# Patient Record
Sex: MALE | Race: ASIAN | URBAN - METROPOLITAN AREA
[De-identification: names, ages, dates, MRNs, and addresses within clinical notes are randomized per-mention and may not be internally consistent; named-entity substitution may affect disease eponyms.]

---

## 2017-01-04 ENCOUNTER — OFFICE VISIT (OUTPATIENT)
Dept: INTERNAL MEDICINE | Facility: CLINIC | Age: 62
End: 2017-01-04
Payer: COMMERCIAL

## 2017-01-04 VITALS
WEIGHT: 194.4 LBS | HEIGHT: 69 IN | HEART RATE: 65 BPM | TEMPERATURE: 97.8 F | SYSTOLIC BLOOD PRESSURE: 140 MMHG | BODY MASS INDEX: 28.79 KG/M2 | OXYGEN SATURATION: 99 % | DIASTOLIC BLOOD PRESSURE: 76 MMHG

## 2017-01-04 DIAGNOSIS — E11.21 TYPE 2 DIABETES MELLITUS WITH DIABETIC NEPHROPATHY, WITH LONG-TERM CURRENT USE OF INSULIN (H): ICD-10-CM

## 2017-01-04 DIAGNOSIS — Z79.4 TYPE 2 DIABETES MELLITUS WITH DIABETIC NEPHROPATHY, WITH LONG-TERM CURRENT USE OF INSULIN (H): ICD-10-CM

## 2017-01-04 DIAGNOSIS — Z23 NEED FOR PROPHYLACTIC VACCINATION AND INOCULATION AGAINST INFLUENZA: ICD-10-CM

## 2017-01-04 DIAGNOSIS — R10.11 RUQ ABDOMINAL PAIN: Primary | ICD-10-CM

## 2017-01-04 PROCEDURE — 90471 IMMUNIZATION ADMIN: CPT | Performed by: INTERNAL MEDICINE

## 2017-01-04 PROCEDURE — 90686 IIV4 VACC NO PRSV 0.5 ML IM: CPT | Performed by: INTERNAL MEDICINE

## 2017-01-04 PROCEDURE — 99214 OFFICE O/P EST MOD 30 MIN: CPT | Mod: 25 | Performed by: INTERNAL MEDICINE

## 2017-01-04 NOTE — PROGRESS NOTES
"  SUBJECTIVE:                                                    Ulises Roblero is a 61 year old male who presents to clinic today for the following health issues:    ABDOMINAL PAIN     Onset: 2 days     Description:   Character: Continuous Dull ache  Location: right upper quadrant  Radiation: None    Intensity: mild    Progression of Symptoms:  same    Accompanying Signs & Symptoms:  Fever/Chills?: no   Gas/Bloating: YES- more than usual  Nausea: no   Vomitting: no   Diarrhea?: no   Constipation:no   Dysuria or Hematuria: no    History:   Trauma: no   Previous similar pain: no    Previous tests done: none    Precipitating factors:   Does the pain change with:     Food: no      BM: no     Urination: no     Alleviating factors:  none    Therapies Tried and outcome: none    LMP:  not applicable       Problem list and histories reviewed & adjusted, as indicated.  Additional history: as documented    Problem list, Medication list, Allergies, and Medical/Social/Surgical histories reviewed in EPIC and updated as appropriate.    ROS:  Constitutional, HEENT, cardiovascular, pulmonary, gi and gu systems are negative, except as otherwise noted.    OBJECTIVE:                                                    /76 mmHg  Pulse 65  Temp(Src) 97.8  F (36.6  C) (Oral)  Ht 5' 8.5\" (1.74 m)  Wt 194 lb 6.4 oz (88.179 kg)  BMI 29.13 kg/m2  SpO2 99%  Body mass index is 29.13 kg/(m^2).  GENERAL APPEARANCE: alert, no distress and over weight  HENT: nose and mouth without ulcers or lesions  NECK: no adenopathy, no asymmetry, masses, or scars and thyroid normal to palpation  RESP: lungs clear to auscultation - no rales, rhonchi or wheezes  CV: regular rates and rhythm, normal S1 S2, no S3 or S4 and no murmur, click or rub  ABDOMEN: soft, mild tenderness in ruq. No rebound or guarding, without hepatosplenomegaly or masses and bowel sounds normal    Diagnostic test results:  Pt was to go to lab but did not show up  "     ASSESSMENT/PLAN:                                                    1. RUQ abdominal pain  Mild RUQ pain but duration is > 1 mo so w/u warranted  - US Abdomen Limited; Future  - Hepatic panel (Albumin, ALT, AST, Bili, Alk Phos, TP); Future  - Lipase; Future    2. Need for prophylactic vaccination and inoculation against influenza  - FLU VAC, SPLIT VIRUS IM > 3 YO (QUADRIVALENT) [75841]  - Vaccine Administration, Initial [33784]    3. Type 2 diabetes mellitus with diabetic nephropathy, with long-term current use of insulin (H)  Poorly controlled- recheck today but pt did not show up for labs?  - Hemoglobin A1c; Future    4. HTN  Worse- maybe related to pain?  Recheck on return     Follow up with Provider after next lab draw -     Bong Whitmore MD  [facility]        Injectable Influenza Immunization Documentation    1.  Is the person to be vaccinated sick today?  No    2. Does the person to be vaccinated have an allergy to eggs or to a component of the vaccine?  No    3. Has the person to be vaccinated today ever had a serious reaction to influenza vaccine in the past?  No    4. Has the person to be vaccinated ever had Guillain-Wilson syndrome?  No     Form completed by Yola Harrington CMA

## 2017-01-04 NOTE — NURSING NOTE
"Chief Complaint   Patient presents with     Abdominal Pain     right upper        Initial /76 mmHg  Pulse 65  Temp(Src) 97.8  F (36.6  C) (Oral)  Ht 5' 8.5\" (1.74 m)  Wt 194 lb 6.4 oz (88.179 kg)  BMI 29.13 kg/m2  SpO2 99% Estimated body mass index is 29.13 kg/(m^2) as calculated from the following:    Height as of this encounter: 5' 8.5\" (1.74 m).    Weight as of this encounter: 194 lb 6.4 oz (88.179 kg).  BP completed using cuff size: large    "

## 2017-01-04 NOTE — MR AVS SNAPSHOT
After Visit Summary   1/4/2017    Ulises Roblero    MRN: [id number]           Patient Information     Date Of Birth          1955        Visit Information        Provider Department      1/4/2017 7:40 AM Bong Whitmore MD [facility]        Today's Diagnoses     RUQ abdominal pain    -  1     Need for prophylactic vaccination and inoculation against influenza         Type 2 diabetes mellitus with diabetic nephropathy, with long-term current use of insulin (H)            Follow-ups after your visit        Your next 10 appointments already scheduled     Jan 20, 2017 11:30 AM   Diabetic Education with [facility] DIABETIC ED RESOURCE   [facility]    [address] [place] [id number]   [phone]              Future tests that were ordered for you today     Open Future Orders        Priority Expected Expires Ordered    US Abdomen Limited Routine  1/4/2018 1/4/2017            Who to contact     If you have questions or need follow up information about today's clinic visit or your schedule please contact [facility] directly at [phone].  Normal or non-critical lab and imaging results will be communicated to you by [employer], letter or phone within 4 business days after the clinic has received the results. If you do not hear from us within 7 days, please contact the clinic through [employer] or phone. If you have a critical or abnormal lab result, we will notify you by phone as soon as possible.  Submit refill requests through [employer] or call your pharmacy and they will forward the refill request to us. Please allow 3 business days for your refill to be completed.          Additional Information About Your Visit        MyChart Information     [employer] lets you send messages to your doctor, view your test results, renew your prescriptions, schedule appointments and more. To sign up, go to  [email or web address] . Click on \"Log in\" on the left side of the screen, which will take you to the Welcome page. Then click on \"Sign up Now\" on the right side of the page.     You will be asked to enter the access code listed below, as well as some personal information. Please follow the directions to create your username and password.     Your access code is: [email or web address]  Expires: 2017  8:24 AM     Your access code will  in 90 days. If you need help or a new code, please call your [place] clinic or [phone].        Care EveryWhere ID     This is your Care EveryWhere ID. This could be used by other organizations to access your [place] medical records  [id number]        Your Vitals Were     Pulse Temperature Height BMI (Body Mass Index) Pulse Oximetry       65 97.8  F (36.6  C) (Oral) 5' 8.5\" (1.74 m) 29.13 kg/m2 99%        Blood Pressure from Last 3 Encounters:   17 140/76   16 148/64   16 130/60    Weight from Last 3 Encounters:   17 194 lb 6.4 oz (88.179 kg)   16 190 lb 6.4 oz (86.365 kg)   16 190 lb 9.6 oz (86.456 kg)              We Performed the Following     FLU VAC, SPLIT VIRUS IM > 3 YO (QUADRIVALENT) [02972]     Hemoglobin A1c     Hepatic panel (Albumin, ALT, AST, Bili, Alk Phos, TP)     Lipase     Vaccine Administration, Initial [[id number]]        Primary Care Provider Office Phone # Fax #    Bong Whitmore [phone]       [facility] [address] [facility] [address]        Thank you!     Thank you for choosing [facility]  for your care. Our goal is always to provide you with excellent care. Hearing back from our patients is one way we can continue to improve our services. Please take a few minutes to complete the written survey that you may receive in the mail after your visit with us. Thank you!             Your Updated Medication List - Protect others around you: Learn how to safely " use, store and throw away your medicines at www.disposemymeds.org.          This list is accurate as of: 1/4/17  8:24 AM.  Always use your most recent med list.                   Brand Name Dispense Instructions for use    amLODIPine 10 MG tablet    NORVASC    90 tablet    Take 1 tablet (10 mg) by mouth every evening       aspirin 81 MG tablet     30 tablet    Take 1 tablet (81 mg) by mouth daily       atorvastatin 20 MG tablet    LIPITOR    90 tablet    Take 1 tablet (20 mg) by mouth At Bedtime       blood glucose monitoring lancets     3 Box    Use to test blood sugars 4 times daily (w/each meal + 1 post prandial)  (30 g lancet)       blood glucose monitoring test strip    ONE TOUCH ULTRA    360 each    Use to test blood sugars 4 times daily (w/each meal + 1 post prandial)       insulin glargine 100 UNIT/ML injection    LANTUS SOLOSTAR    15 mL    09/26/16: 40 units in AM and 28 units at night       insulin lispro 100 UNIT/ML injection    HumaLOG PEN    1 Month    10 units with each meal + sliding scale of 2 unit per 50 mg/dl increase in glucose above 150. About 40 Units/day       insulin pen needle 31G X 5 MM     360 each    Use 3 pen needles daily or as directed.       lisinopril-hydrochlorothiazide 20-25 MG per tablet    PRINZIDE/ZESTORETIC    90 tablet    Take 1 tablet by mouth every morning       metFORMIN 1000 MG tablet    GLUCOPHAGE    180 tablet    Take 1 tablet (1,000 mg) by mouth 2 times daily (with meals)       spironolactone 50 MG tablet    ALDACTONE    90 tablet    Take 1 tablet (50 mg) by mouth daily

## 2017-01-20 DIAGNOSIS — Z79.4 TYPE 2 DIABETES MELLITUS WITH DIABETIC NEPHROPATHY, WITH LONG-TERM CURRENT USE OF INSULIN (H): Primary | ICD-10-CM

## 2017-01-20 DIAGNOSIS — E11.21 TYPE 2 DIABETES MELLITUS WITH DIABETIC NEPHROPATHY, WITH LONG-TERM CURRENT USE OF INSULIN (H): Primary | ICD-10-CM

## 2017-01-20 NOTE — TELEPHONE ENCOUNTER
[place] [facility] 5x3ML         Last Written Prescription Date: 12/21/16  Last Fill Quantity: 15mL, # refills: 1  Last Office Visit with G, P or [facility] prescribing provider:  01/04/17        BP Readings from Last 3 Encounters:   01/04/17 140/76   12/13/16 148/64   09/26/16 130/60     MICROL      185   9/26/2016  No results found for this basename: microalbumin  CREATININE   Date Value Ref Range Status   09/26/2016 1.03 0.66 - 1.25 mg/dL Final   ]  GFR ESTIMATE   Date Value Ref Range Status   09/26/2016 73 >60 mL/min/1.7m2 Final     Comment:     Non  GFR Calc   06/03/2016 78 >60 mL/min/1.7m2 Final     Comment:     Non  GFR Calc   04/27/2016 79 >60 mL/min/1.7m2 Final     Comment:     Non  GFR Calc     GFR ESTIMATE IF BLACK   Date Value Ref Range Status   09/26/2016 89 >60 mL/min/1.7m2 Final     Comment:      GFR Calc   06/03/2016 >90   GFR Calc   >60 mL/min/1.7m2 Final   04/27/2016 >90   GFR Calc   >60 mL/min/1.7m2 Final     CHOL      127   9/26/2016  HDL       48   9/26/2016  LDL       53   9/26/2016  LDL      136   8/3/2015  TRIG      132   9/26/2016  No results found for this basename: cholhdlratio  No results found for this basename: ast  ALT       21   9/26/2016  A1C      8.2   9/26/2016  A1C      9.4   6/3/2016  A1C      9.4   2/24/2016  A1C      8.3   8/3/2015  POTASSIUM   Date Value Ref Range Status   09/26/2016 5.0 3.4 - 5.3 mmol/L Final

## 2017-01-24 DIAGNOSIS — Z79.4 TYPE 2 DIABETES MELLITUS WITH DIABETIC NEPHROPATHY, WITH LONG-TERM CURRENT USE OF INSULIN (H): ICD-10-CM

## 2017-01-24 DIAGNOSIS — E11.21 TYPE 2 DIABETES MELLITUS WITH DIABETIC NEPHROPATHY, WITH LONG-TERM CURRENT USE OF INSULIN (H): ICD-10-CM

## 2017-01-24 DIAGNOSIS — R10.11 RUQ ABDOMINAL PAIN: ICD-10-CM

## 2017-01-24 LAB
ALBUMIN SERPL-MCNC: 3.8 G/DL (ref 3.4–5)
ALP SERPL-CCNC: 78 U/L (ref 40–150)
ALT SERPL W P-5'-P-CCNC: 21 U/L (ref 0–70)
AST SERPL W P-5'-P-CCNC: 15 U/L (ref 0–45)
BILIRUB DIRECT SERPL-MCNC: 0.1 MG/DL (ref 0–0.2)
BILIRUB SERPL-MCNC: 0.4 MG/DL (ref 0.2–1.3)
HBA1C MFR BLD: 8.9 % (ref 4.3–6)
LIPASE SERPL-CCNC: 296 U/L (ref 73–393)
PROT SERPL-MCNC: 7.7 G/DL (ref 6.8–8.8)

## 2017-01-24 PROCEDURE — 83036 HEMOGLOBIN GLYCOSYLATED A1C: CPT | Performed by: FAMILY MEDICINE

## 2017-01-24 PROCEDURE — 80076 HEPATIC FUNCTION PANEL: CPT | Performed by: FAMILY MEDICINE

## 2017-01-24 PROCEDURE — 83690 ASSAY OF LIPASE: CPT | Performed by: FAMILY MEDICINE

## 2017-01-24 PROCEDURE — 36415 COLL VENOUS BLD VENIPUNCTURE: CPT | Performed by: FAMILY MEDICINE

## 2017-01-30 NOTE — TELEPHONE ENCOUNTER
Patient is calling back because he would like a 90 days supply instead of a 30 day. His prescription is cheaper that way.(insulin glargine (LANTUS SOLOSTAR) 100 UNIT/ML injection)    Please call with any questions. [phone]

## 2017-02-02 DIAGNOSIS — E11.21 TYPE 2 DIABETES MELLITUS WITH DIABETIC NEPHROPATHY, WITH LONG-TERM CURRENT USE OF INSULIN (H): Primary | ICD-10-CM

## 2017-02-02 DIAGNOSIS — Z79.4 TYPE 2 DIABETES MELLITUS WITH DIABETIC NEPHROPATHY, WITH LONG-TERM CURRENT USE OF INSULIN (H): Primary | ICD-10-CM

## 2017-02-02 NOTE — TELEPHONE ENCOUNTER
insulin lispro          Last Written Prescription Date: 12/21/16  Last Fill Quantity: 1 mth, # refills: 1  Last Office Visit with G, P or [facility] prescribing provider:  01/04/17        BP Readings from Last 3 Encounters:   01/04/17 140/76   12/13/16 148/64   09/26/16 130/60     MICROL      185   9/26/2016  No results found for this basename: microalbumin  CREATININE   Date Value Ref Range Status   09/26/2016 1.03 0.66 - 1.25 mg/dL Final   ]  GFR ESTIMATE   Date Value Ref Range Status   09/26/2016 73 >60 mL/min/1.7m2 Final     Comment:     Non  GFR Calc   06/03/2016 78 >60 mL/min/1.7m2 Final     Comment:     Non  GFR Calc   04/27/2016 79 >60 mL/min/1.7m2 Final     Comment:     Non  GFR Calc     GFR ESTIMATE IF BLACK   Date Value Ref Range Status   09/26/2016 89 >60 mL/min/1.7m2 Final     Comment:      GFR Calc   06/03/2016 >90   GFR Calc   >60 mL/min/1.7m2 Final   04/27/2016 >90   GFR Calc   >60 mL/min/1.7m2 Final     CHOL      127   9/26/2016  HDL       48   9/26/2016  LDL       53   9/26/2016  LDL      136   8/3/2015  TRIG      132   9/26/2016  No results found for this basename: cholhdlratio  AST       15   1/24/2017  ALT       21   1/24/2017  A1C      8.9   1/24/2017  A1C      8.2   9/26/2016  A1C      9.4   6/3/2016  A1C      9.4   2/24/2016  A1C      8.3   8/3/2015  POTASSIUM   Date Value Ref Range Status   09/26/2016 5.0 3.4 - 5.3 mmol/L Final

## 2017-03-09 ENCOUNTER — TELEPHONE (OUTPATIENT)
Dept: INTERNAL MEDICINE | Facility: CLINIC | Age: 62
End: 2017-03-09

## 2017-03-24 DIAGNOSIS — E11.9 TYPE 2 DIABETES, HBA1C GOAL < 7% (H): ICD-10-CM

## 2017-03-24 NOTE — TELEPHONE ENCOUNTER
metFORMIN (GLUCOPHAGE) 1000 MG tablet         Last Written Prescription Date: 11/23/2016  Last Fill Quantity: 180, # refills: 0  Last Office Visit with [facility] or [facility] prescribing provider:  1/04/2017        BP Readings from Last 3 Encounters:   01/04/17 140/76   12/13/16 148/64   09/26/16 130/60     Lab Results   Component Value Date    MICROL 185 09/26/2016     No results found for: MICROALBUMIN  Creatinine   Date Value Ref Range Status   09/26/2016 1.03 0.66 - 1.25 mg/dL Final   ]  GFR Estimate   Date Value Ref Range Status   09/26/2016 73 >60 mL/min/1.7m2 Final     Comment:     Non  GFR Calc   06/03/2016 78 >60 mL/min/1.7m2 Final     Comment:     Non  GFR Calc   04/27/2016 79 >60 mL/min/1.7m2 Final     Comment:     Non  GFR Calc     GFR Estimate If Black   Date Value Ref Range Status   09/26/2016 89 >60 mL/min/1.7m2 Final     Comment:      GFR Calc   06/03/2016 >90   GFR Calc   >60 mL/min/1.7m2 Final   04/27/2016 >90   GFR Calc   >60 mL/min/1.7m2 Final     Lab Results   Component Value Date    CHOL 127 09/26/2016     Lab Results   Component Value Date    HDL 48 09/26/2016     Lab Results   Component Value Date    LDL 53 09/26/2016     Lab Results   Component Value Date    TRIG 132 09/26/2016     No results found for: CHOLHDLRATIO  Lab Results   Component Value Date    AST 15 01/24/2017     Lab Results   Component Value Date    ALT 21 01/24/2017     Lab Results   Component Value Date    A1C 8.9 01/24/2017    A1C 8.2 09/26/2016    A1C 9.4 06/03/2016    A1C 9.4 02/24/2016    A1C 8.3 08/03/2015     Potassium   Date Value Ref Range Status   09/26/2016 5.0 3.4 - 5.3 mmol/L Final

## 2017-04-14 DIAGNOSIS — E11.21 TYPE 2 DIABETES MELLITUS WITH DIABETIC NEPHROPATHY, WITH LONG-TERM CURRENT USE OF INSULIN (H): ICD-10-CM

## 2017-04-14 DIAGNOSIS — Z79.4 TYPE 2 DIABETES MELLITUS WITH DIABETIC NEPHROPATHY, WITH LONG-TERM CURRENT USE OF INSULIN (H): ICD-10-CM

## 2017-04-14 NOTE — TELEPHONE ENCOUNTER
Patient would like a 90 days supply    [place]         Last Written Prescription Date: 02/01/17  Last Fill Quantity: 15ml, # refills: 1  Last Office Visit with G, P or [facility] prescribing provider:  12/13/16        BP Readings from Last 3 Encounters:   01/04/17 140/76   12/13/16 148/64   09/26/16 130/60     Lab Results   Component Value Date    MICROL 185 09/26/2016     Lab Results   Component Value Date    UMALCR 308.33 09/26/2016     Creatinine   Date Value Ref Range Status   09/26/2016 1.03 0.66 - 1.25 mg/dL Final   ]  GFR Estimate   Date Value Ref Range Status   09/26/2016 73 >60 mL/min/1.7m2 Final     Comment:     Non  GFR Calc   06/03/2016 78 >60 mL/min/1.7m2 Final     Comment:     Non  GFR Calc   04/27/2016 79 >60 mL/min/1.7m2 Final     Comment:     Non  GFR Calc     GFR Estimate If Black   Date Value Ref Range Status   09/26/2016 89 >60 mL/min/1.7m2 Final     Comment:      GFR Calc   06/03/2016 >90   GFR Calc   >60 mL/min/1.7m2 Final   04/27/2016 >90   GFR Calc   >60 mL/min/1.7m2 Final     Lab Results   Component Value Date    CHOL 127 09/26/2016     Lab Results   Component Value Date    HDL 48 09/26/2016     Lab Results   Component Value Date    LDL 53 09/26/2016     Lab Results   Component Value Date    TRIG 132 09/26/2016     No results found for: CHOLHDLRATIO  Lab Results   Component Value Date    AST 15 01/24/2017     Lab Results   Component Value Date    ALT 21 01/24/2017     Lab Results   Component Value Date    A1C 8.9 01/24/2017    A1C 8.2 09/26/2016    A1C 9.4 06/03/2016    A1C 9.4 02/24/2016    A1C 8.3 08/03/2015     Potassium   Date Value Ref Range Status   09/26/2016 5.0 3.4 - 5.3 mmol/L Final

## 2017-04-14 NOTE — TELEPHONE ENCOUNTER
Sent one month supply. Please advise when pt need to come back for DM f/u & labs. Last DM f/u was on 01/04/17. Thanks.    Kamar, RN  Triage Nurse

## 2017-04-17 DIAGNOSIS — Z79.4 TYPE 2 DIABETES MELLITUS WITH DIABETIC NEPHROPATHY, WITH LONG-TERM CURRENT USE OF INSULIN (H): Primary | ICD-10-CM

## 2017-04-17 DIAGNOSIS — E11.21 TYPE 2 DIABETES MELLITUS WITH DIABETIC NEPHROPATHY, WITH LONG-TERM CURRENT USE OF INSULIN (H): Primary | ICD-10-CM

## 2017-04-17 RX ORDER — INSULIN GLARGINE 100 [IU]/ML
INJECTION, SOLUTION SUBCUTANEOUS
Qty: 15 ML | Refills: 5 | Status: SHIPPED | OUTPATIENT
Start: 2017-04-17 | End: 2017-04-18

## 2017-04-17 NOTE — TELEPHONE ENCOUNTER
Ok to change to basaglar.  Same dose as lantus.  Please pend the orders with correct quantity, select pharmacy and then send for signature. Also associate with correct diagnosis.    Thank you.    Malaika Wilson

## 2017-04-17 NOTE — TELEPHONE ENCOUNTER
Patients insurance will not cover Lantus, formulary alternatives are Basaglar (Tier 1) or Levemir (Tier 2), please order alternative to Lantus if appropriate.

## 2017-04-17 NOTE — TELEPHONE ENCOUNTER
Lab Results   Component Value Date    A1C 8.9 01/24/2017    A1C 8.2 09/26/2016    A1C 9.4 06/03/2016    A1C 9.4 02/24/2016    A1C 8.3 08/03/2015     Ok to send 90 day supply.  F/u in 2-3 months.

## 2017-04-17 NOTE — TELEPHONE ENCOUNTER
Called [facility] to changed the dispense quantity to 3 months supply with no refill. Was notified that lantus solostar isn't covered from 04/01/17. Either need to change to Basaglar(tier 1) or Levemir(tier 2). Or need to start PA.     Please advise. Need to send a new rx for the alternate med or call [phone](ref # [id number]) to start PA. Thanks.    Kamar, RN  Triage Nurse

## 2017-04-18 RX ORDER — INSULIN GLARGINE 100 [IU]/ML
INJECTION, SOLUTION SUBCUTANEOUS
Qty: 75 ML | Refills: 1 | Status: SHIPPED | OUTPATIENT
Start: 2017-04-18

## 2017-04-18 NOTE — TELEPHONE ENCOUNTER
Spoke with Jovana @ [facility].  Received Basaglar rx but states 15ml quantity is only a 23-24 day supply.  Need to fax in 75ml (or 5 boxes) quantity for a 3 mo supply.    Rx refaxed with 75ml quantity--see refill request from 4-17-17. .

## 2017-04-18 NOTE — TELEPHONE ENCOUNTER
See refill request message from 4-14-17 (Spoke with Jovana @ [facility].  Received Basaglar rx but states 15ml quantity is only a 23-24 day supply.  Need to fax in 75ml (or 5 boxes) quantity for a 3 mo supply.)    Rx refaxed with 75ml quantity.  .

## 2017-08-17 ENCOUNTER — TELEPHONE (OUTPATIENT)
Dept: ENDOCRINOLOGY | Facility: CLINIC | Age: 62
End: 2017-08-17

## 2017-08-17 NOTE — TELEPHONE ENCOUNTER
Panel Management Review      Patient has the following on his problem list:     Diabetes    ASA: Passed    Last A1C  Lab Results   Component Value Date    A1C 8.9 01/24/2017    A1C 8.2 09/26/2016    A1C 9.4 06/03/2016    A1C 9.4 02/24/2016    A1C 8.3 08/03/2015     A1C tested: FAILED    Last LDL:    Lab Results   Component Value Date    CHOL 127 09/26/2016     Lab Results   Component Value Date    HDL 48 09/26/2016     Lab Results   Component Value Date    LDL 53 09/26/2016     Lab Results   Component Value Date    TRIG 132 09/26/2016     No results found for: CHOLHDLRATIO  Lab Results   Component Value Date    NHDL 79 09/26/2016       Is the patient on a Statin? YES             Is the patient on Aspirin? YES    Medications     HMG CoA Reductase Inhibitors    atorvastatin (LIPITOR) 20 MG tablet    Salicylates    aspirin 81 MG tablet          Last three blood pressure readings:  BP Readings from Last 3 Encounters:   01/04/17 140/76   12/13/16 148/64   09/26/16 130/60       Date of last diabetes office visit: 12/13/16     Tobacco History:     History   Smoking Status     Former Smoker     Years: 5.00     Types: Cigarettes   Smokeless Tobacco     Never Used     Comment: quit at ~ age 34         Hypertension   Last three blood pressure readings:  BP Readings from Last 3 Encounters:   01/04/17 140/76   12/13/16 148/64   09/26/16 130/60     Blood pressure: MONITOR    HTN Guidelines:  Age 18-59 BP range:  Less than 140/90  Age 60-85 with Diabetes:  Less than 140/90  Age 60-85 without Diabetes:  less than 150/90          Composite cancer screening  Chart review shows that this patient is due/due soon for the following None  Summary:    Patient is due/failing the following:   FOLLOW UP    Action needed:   Patient needs office visit for dm.    Type of outreach:    Sent letter.    Questions for provider review:    None                                                                                                                                     Natalia Lindsey CMA ([facility])       Chart routed to closed .

## 2017-08-17 NOTE — LETTER
[facility]  [address]  [facility] [id number] [phone]    [facility]  [address]  [facility] [id number] [phone]      8/17/2017  Ulises Roblero  [address]   [place] [address]    Dear Ulises    I care about your health and have reviewed your health plan. I have reviewed your medical conditions, medication list, and lab results and am making recommendations based on this review, to better manage your health.    You are in particular need of attention regarding:  -Diabetes    I am recommending that you:  -schedule a FOLLOWUP OFFICE APPOINTMENT with me.      Health Maintenance Due   Topic Date Due     PNEUMOVAX 1X HI RISK PATIENT < 65 (NO IB MSG)  08/13/1956     TETANUS IMMUNIZATION (SYSTEM ASSIGNED)  08/13/1972     HEPATITIS C SCREENING  08/13/1972     ADVANCE DIRECTIVE PLANNING Q5 YRS  08/13/2009     EYE EXAM Q1 YEAR  12/08/2016     FOOT EXAM Q1 YEAR  03/22/2017     A1C Q6 MO  07/24/2017     Please call us at [phone] ([place]),  [phone] ([place]) or use [employer] to address the above recommendations.     Thank you for trusting [facility] and we appreciate the opportunity to serve you.  We look forward to supporting your healthcare needs in the future.    Healthy Regards,    Malaika Wilson MD  Endocrinology  [facility]/[place]  August 17, 2017